# Patient Record
Sex: MALE | Race: WHITE
[De-identification: names, ages, dates, MRNs, and addresses within clinical notes are randomized per-mention and may not be internally consistent; named-entity substitution may affect disease eponyms.]

---

## 2017-06-12 ENCOUNTER — HOSPITAL ENCOUNTER (INPATIENT)
Dept: HOSPITAL 23 - P2S | Age: 27
LOS: 1 days | Discharge: HOME | DRG: 897 | End: 2017-06-13
Attending: PSYCHIATRY & NEUROLOGY | Admitting: PSYCHIATRY & NEUROLOGY
Payer: COMMERCIAL

## 2017-06-12 DIAGNOSIS — Z81.1: ICD-10-CM

## 2017-06-12 DIAGNOSIS — F11.23: Primary | ICD-10-CM

## 2017-06-12 DIAGNOSIS — F13.10: ICD-10-CM

## 2017-06-12 DIAGNOSIS — F60.2: ICD-10-CM

## 2017-06-12 DIAGNOSIS — Z81.8: ICD-10-CM

## 2017-06-12 DIAGNOSIS — Z88.0: ICD-10-CM

## 2017-06-12 DIAGNOSIS — Z88.1: ICD-10-CM

## 2017-06-12 PROCEDURE — HZ2ZZZZ DETOXIFICATION SERVICES FOR SUBSTANCE ABUSE TREATMENT: ICD-10-PCS | Performed by: PSYCHIATRY & NEUROLOGY

## 2017-06-13 LAB
BARBITURATES UR QL SCN: 1 MG/DL (ref 0.2–2)
BARBITURATES UR QL SCN: 4.7 G/DL (ref 3.5–5)
BARBITURATES: (no result)
BASOPHIL#: 0 X10E3 (ref 0–0.3)
BASOPHIL%: 0.4 % (ref 0–2.5)
BENZODIAZ UR QL SCN: 20 U/L (ref 10–42)
BENZODIAZ UR QL SCN: 29 U/L (ref 10–40)
BENZODIAZEPINES: (no result)
BLOOD UREA NITROGEN: 12 MG/DL (ref 9–23)
BUN/CREATININE RATIO: 15
BZE UR QL SCN: 45 U/L (ref 32–92)
CALCIUM SERUM: 9.4 MG/DL (ref 8.4–10.2)
CK MB SERPL-RTO: 13.4 % (ref 11–15.5)
CK MB SERPL-RTO: 33.4 G/DL (ref 30–36)
COCAINE: (no result)
CREATININE SERUM: 0.8 MG/DL (ref 0.6–1.4)
DIFF IND: NO
DX ICD CODE: (no result)
DX ICD CODE: (no result)
EOSINOPHIL#: 0.1 X10E3 (ref 0–0.7)
EOSINOPHIL%: 1.4 % (ref 0–7)
GLOM FILT RATE ESTIMATED: 124.2 ML/MIN (ref 60–?)
GLUCOSE FASTING: 70 MG/DL (ref 70–110)
HEMATOCRIT: 47.2 % (ref 38–50)
HEMOGLOBIN: 15.8 GM/DL (ref 13–16)
KETONES UR QL: 26 MMOL/L (ref 22–31)
KETONES UR QL: 98 MMOL/L (ref 100–111)
LYMPHOCYTE#: 3.1 X10E3 (ref 1–3.5)
LYMPHOCYTE%: 44.7 % (ref 17–45)
MEAN CELL VOLUME: 87.2 FL (ref 83–96)
MEAN CORPUSCULAR HEMOGLOBIN: 29.1 PG (ref 28–34)
MEAN PLATELET VOLUME: 9 FL (ref 6.5–11.5)
MONOCYTE#: 0.4 X10E3 (ref 0–1)
MONOCYTE%: 6.3 % (ref 3–12)
NEUTROPHIL#: 3.3 X10E3 (ref 1.5–7.1)
NEUTROPHIL%: 47.2 % (ref 40–75)
OPIATES: (no result)
PLATELET COUNT: 207 X10E3 (ref 140–420)
POTASSIUM: 4.1 MMOL/L (ref 3.5–5.1)
PROTEIN TOTAL SERUM: 7.3 G/DL (ref 6–8.3)
RED BLOOD COUNT: 5.41 X10E (ref 3.9–5.6)
SODIUM: 133 MMOL/L (ref 135–145)
TRICYCLIC ANTIDEPRESSANTS: (no result)
U METHADONE: (no result)
URINE APPEARANCE: (no result)
URINE BILIRUBIN: (no result)
URINE BLOOD: (no result)
URINE COLOR: (no result)
URINE GLUCOSE: (no result) MG/DL
URINE KETONE: (no result)
URINE LEUKOCYTE ESTERASE: (no result)
URINE NITRATE: (no result)
URINE PH: 6 (ref 5–8)
URINE PROTEIN: (no result)
URINE SOURCE: (no result)
URINE SPECIFIC GRAVITY: 1.03 (ref 1–1.03)
URINE UROBILINOGEN: 1 MG/DL
WHITE BLOOD COUNT: 7 X10E3 (ref 4–10.5)

## 2017-07-25 ENCOUNTER — HOSPITAL ENCOUNTER (EMERGENCY)
Facility: HOSPITAL | Age: 27
Discharge: LEFT BEFORE DISCHARGE | End: 2017-07-25

## 2017-07-25 VITALS
RESPIRATION RATE: 18 BRPM | WEIGHT: 185 LBS | DIASTOLIC BLOOD PRESSURE: 86 MMHG | HEIGHT: 69 IN | OXYGEN SATURATION: 100 % | BODY MASS INDEX: 27.4 KG/M2 | HEART RATE: 100 BPM | SYSTOLIC BLOOD PRESSURE: 128 MMHG | TEMPERATURE: 96 F

## 2017-07-25 PROCEDURE — 99211 OFF/OP EST MAY X REQ PHY/QHP: CPT
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
